# Patient Record
(demographics unavailable — no encounter records)

---

## 2024-10-08 NOTE — PHYSICAL EXAM
[Alert] : alert [Well Nourished] : well nourished [No Acute Distress] : no acute distress [Well Developed] : well developed [Normal Sclera/Conjunctiva] : normal sclera/conjunctiva [EOMI] : extra ocular movement intact [No Proptosis] : no proptosis [Normal Oropharynx] : the oropharynx was normal [Thyroid Not Enlarged] : the thyroid was not enlarged [No Thyroid Nodules] : no palpable thyroid nodules [No Respiratory Distress] : no respiratory distress [No Accessory Muscle Use] : no accessory muscle use [Clear to Auscultation] : lungs were clear to auscultation bilaterally [Normal S1, S2] : normal S1 and S2 [Normal Rate] : heart rate was normal [Regular Rhythm] : with a regular rhythm [No Edema] : no peripheral edema [Pedal Pulses Normal] : the pedal pulses are present [Normal Bowel Sounds] : normal bowel sounds [Not Tender] : non-tender [Not Distended] : not distended [Soft] : abdomen soft [Normal Anterior Cervical Nodes] : no anterior cervical lymphadenopathy [No Spinal Tenderness] : no spinal tenderness [Spine Straight] : spine straight [No Stigmata of Cushings Syndrome] : no stigmata of Cushings Syndrome [Normal Gait] : normal gait [Normal Strength/Tone] : muscle strength and tone were normal [No Rash] : no rash [Normal Reflexes] : deep tendon reflexes were 2+ and symmetric [No Tremors] : no tremors [Oriented x3] : oriented to person, place, and time [Acanthosis Nigricans] : no acanthosis nigricans [de-identified] : Obese male

## 2024-10-08 NOTE — HISTORY OF PRESENT ILLNESS
[FreeTextEntry1] : 63-year-old man with history of type 2 diabetes, hyperlipidemia, hypertension, here to follow-up. Diabetes history: Diagnosed many years ago.   Prior regimen included Farxiga, glipizide, Basaglar.  Patient reports that he has been fasting in the morning. Eats his first meal at 12 PM, usually tunafish sandwich and a piece of bread For dinner, he usually eats grilled chicken on a piece of fish, corn once a while.  He reports adherence to a carb consistent diet. Of note, patient often take very variable dosage of insulin throughout his visits with me or whenever he calls the office for insulin recommendation.  Had poorly controlled type 2 diabetes before on very high doses of insulin.  Also was not taking medication consistently and also not taking the same doses each time therefore it was very difficult to adjust insulin regimen for him.  Patient had made significant changes in his diet.  He also reports a weight loss of 40 pounds intentionally since last visit.  Since then his insulin requirement had decreased significantly.  He reported adherence to his medication.  He checks glucose 3-4 times daily.  He did not bring his meter with him today.  He left in the car.  He may send it up later for us to download.  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  He has been walking a lot at work.  Regarding hypertension, currently taking losartan hydrochlorothiazide 100-25 mg once daily.  Regarding hyperlipidemia, he is currently taking fenofibrate 135 mg once daily and simvastatin 20 mg once daily.  Ophthalmology: Seen in the last few months within normal limits.  No diabetic retinopathy.  Podiatry: Seeing every few months for nail clipping.    DM meds: Ozempic (2 MG/DOSE) 8 MG/3ML Subcutaneous Solution Pen-injector; INJECT  2MG ONCE WEEKLY Metformin ER 1000mg twice daily  Basaglar KwikPen 100 UNIT/ML Subcutaneous Solution Pen-injector; INJECT 60 UNITS NovoLOG FlexPen 100 UNIT/ML Subcutaneous Solution Pen-injector; 5-10 units TID with meals as per sliding scale [Continuous Glucose Monitoring] : Continuous Glucose Monitoring: Yes [Dexcom] : Dexcom [FreeTextEntry2] : 56     [FreeTextEntry3] : 44 [FreeTextEntry4] : 1 [de-identified] : 7.5% [FreeTextEntry5] : 177 [FreeTextEntry7] : 47

## 2024-10-08 NOTE — ASSESSMENT
[FreeTextEntry1] : Mr. IVONE KEITH is 61 year old male here for DM, HTN, and HLD follow up  1. Type 2 DM A1c 7.1% October 2024 A1c 7.6% July 2024 A1c 8.6% April 2023 A1c 6.9% November 2022 A1c 9.3% April 2021 A1c 9.3% December 2020 Diabetes plan: Continue with Basaglar 60 units at bedtime Continue with aspart 5 units 3 times daily with meals with low-dose sliding scale as above. Continue with Ozempic 2.0 mg once weekly Continue with metformin at 1000 mg twice daily  ACP follow-up in 3 months Follow-up with me in 6 months Up-to-date with ophthalmologist Up-to-date with podiatrist every few months.  Discussed risks of the thyroid C-cell tumor, pancreatitis, hypoglycemia, hypersensitivity reactions, acute kidney injury, severe gastrointestinal complications, discussed that the most common adverse infections included nausea, diarrhea, vomiting, and abdominal pain.  We discussed that gastric emptying slow by GLP-1 receptor agonist.   Diabetic retinopathy complications have been reported in a cardiovascular outcomes trial. Should have semi-annual eye exam and close follow up with his/her ophthalmologist.  2. Hypertension.  Blood pressure goal less than 130/80 Recommend to continue with current regimen  Losartan-HCTZ 100-25mg daily Amlodipine 5 mg once daily On ARB for elevated albumin/creatine ratio.  We will be obtaining albumin/renin ratio next visit.  3. Hyperlipidemia.   Continue with fenofibrate 145 mg once daily.  Patient is currently on simvastatin 20 mg once daily Check fasting lipid profile once annually.  4. ART He hasn't been seeing his pulmonary doctor  he reports it improved after weight loss.   He appreciates a referral to see the specialist.

## 2024-10-08 NOTE — THERAPY
[Today's Date] : [unfilled] [TextEntry] : BASAGLAR 60 UNITS  ASPART 5 UNITS WITH MEALS  Take additional  1 Unit(s) if Glucose 151 - 200 2 Unit(s) if Glucose 201 - 250 3 Unit(s) if Glucose 251 - 300 4 Unit(s) if Glucose 301 - 350 5 Unit(s) if Glucose 351 - 400 6 Unit(s) if Glucose GREATER THAN 400 NOTIFY Provider for blood glucose LESS THAN 70 milliGRAM(s)/deciLiter or GREATER THAN 400 milliGRAM(s)/deciliter.  METFORMIN 1000MG twice daily   OZEMPIC 2.0MG ONCE WEEKLY

## 2024-11-20 NOTE — ASSESSMENT
[FreeTextEntry1] : The condition was explained to the patient. Discussed that arthritis is progressive, but has a remitting and relapsing course. Discussed treatment ladder - observation, oral NSAIDs, bracing, steroid injection, and ultimately surgery if necessary. - recommend comfort cool thumb CMCJ splint PRN. - pain guided activity modification, OTC adaptive tools/, moist heat PRN. - recommend OTC pain medications such as Tylenol and NSAIDs as needed. reviewed contra-indicated medical conditions (eg liver disease, kidney disease, or GI ulcer/bleeding) or medications (eg blood thinners). Discussed possible GI and blood pressure side effects.   F/u PRN.

## 2024-11-20 NOTE — IMAGING
[de-identified] : LEFT HAND skin intact. no swelling. TTP to thumb CMCJ. wrist ROM: good extension, flexion. good pronation, supination. good EPL, FPL. fingers good extension, flex to full fist. good finger abduction and adduction. SILT to median, ulnar, radial distribution. palpable radial pulse, brisk cap refill all digits. no triggering.  XRAYS OF LEFT HAND (3 views - PA, OBLIQUE, AND LATERAL VIEWS): no acute displaced fracture or dislocation. djd of thumb CMCJ. djd of thumb IPJ.

## 2024-11-20 NOTE — HISTORY OF PRESENT ILLNESS
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 [employer], ) is here for LEFT thumb pain x 1 year. Denies injury. Reports that he saw an outside provider in May 2024 => CSI. Denies numbness, tingling, radiation, locking, prior injury.   PMH: DM (A1c 7.5) - on insulin, Ozempic, Metformin. HTN. HLD. ART. [FreeTextEntry5] : IVONE muhammad [RHD] 63 year old male is here today c/o LEFT thenar pain for less than one year w/o injury. states he had CSI which resolved symptoms for a few months.

## 2024-12-03 NOTE — PROCEDURE
Samples Given: Merlin (lot [id number], exp 4/2024, two 5g samples) Samples Given: Merlin (lot [id number], exp 4/2024, two 5g samples) [FreeTextEntry3] : The risks, benefits and alternatives to the injection were discussed with the patient. The decision was made to proceed with the injection to reduce inflammation within the area. Verbal consent was obtained for the procedure. The right knee was cleaned with alcohol and ethyl chloride was sprayed topically. 1cc of 40mg Kenalog and 4cc of 1% lidocaine was injected. The patient tolerated the procedure well and was instructed to ice the affected joint as need+-ed later today. Activities can be performed as tolerated

## 2024-12-03 NOTE — ASSESSMENT
[FreeTextEntry1] : 62 year M with right knee OA, prior surgery 50  years ago 1-2 week follow up for CSI since BS is elevated today  05/14/2024: Right knee CSI Follow up PRN  06/04/2024  Left knee CSI today  08/06/2024: R knee CSI Follow up PRN  12/03/2024:  R knee CSI Follow up 3 months

## 2024-12-03 NOTE — IMAGING
[de-identified] : BILATERAL KNEE EXAM Alignment: Neutral, healed anterior surgical incision Effusion: None Atrophy: None                                                  Stable to Varus/valgus stress Posterior Drawer Test: negative Anterior Drawer Test: Equivocal Knee Extension/Flexion: 0 / 120  Medial/lateral compartments Medial joint line: Tenderness Lateral joint line: No Tenderness Saqib test: negative  Patellofemoral joint Medial patellar facet: tenderness Patellar grind: Negative  Tendons: Pes Anserine: No tenderness Gerdys Tubercle/ IT Band: No tenderness Quadriceps Tendon: No Tenderness patellar tendon: no Tenderness Tibial tubercle: not tenderness Calf: no Tenderness  Neurovascular exam Muscle strength: 5-/5 Sensation to light touch: intact Distal pulses: 2+  [id number] Xrays of the Right Knee were taken demonstrating adv medial joint space narrowing left knee with mild OA

## 2025-02-03 NOTE — HISTORY OF PRESENT ILLNESS
[de-identified] : 2/3/25: f/u LEFT thumb CMCJ arthritis. + comfort cool thumb CMC splint. c/o worsening pain. Reports his sciatica has flared up as well. + Advil.  11/20/24: 62yo male (RHD. [employer], ) is here for LEFT thumb pain x 1 year. Denies injury. Reports that he saw an outside provider in May 2024 => CSI. Denies numbness, tingling, radiation, locking, prior injury.   PMH: DM (A1c 7.5) - on insulin, Ozempic, Metformin. HTN. HLD. ART. [FreeTextEntry5] : IVONE is here today to follow up on his LEFT thumb. pt states since last visit, pain increased. wearing splint 75% of the day, some relief.

## 2025-02-03 NOTE — ASSESSMENT
[FreeTextEntry1] : Patient would like to proceed with CSI for thumb CMCJ. Discussed risk of cartilage damage with CSI, particularly with repeated injections. - Discussed risks, benefits, and alternatives as well as contents of injection. Risks include, but are not limited allergic reaction, flare reaction, injection site pain, bruising, numbness, increased blood sugar, elevated blood pressure, facial flushing, skin discoloration, fat atrophy, tendon rupture, and infection. Risk of immune suppression and increased susceptibility to infection with steroid use. Patient expressed understanding and would like to proceed with injection. - The skin over the LEFT thumb CMCJ was cleansed with alcohol/betadine and anesthetized with ethyl chloride and 1cc of 1% lidocaine. The joint was injected with 6mg of celestone. Site was dressed with gauze and an ACE wrap. Patient tolerated the procedure well.  - continue comfort cool thumb CMCJ splint PRN. - prescribed OT for L hand. - pain guided activity modification, OTC adaptive tools/, moist heat PRN. - prescribed Diclofenac gel PRN pain. There is a moderate risk of morbidity with prescription medication due to possible side effects, especially with long term use. These include but are not limited to GI issues (such as stomach pain/upset, heartburn, stomach ulcer), elevated blood pressure, dizziness, bleeding, kidney problems, and cardiac problems. Also discussed risk of skin reactions to topical medications.   F/u PRN.

## 2025-02-03 NOTE — IMAGING
[de-identified] : LEFT HAND skin intact. mild thenar swelling. TTP to thumb CMCJ. wrist ROM: good extension, flexion. good pronation, supination. good EPL, FPL. fingers good extension, flex to full fist. good finger abduction and adduction. SILT to median, ulnar, radial distribution. palpable radial pulse, brisk cap refill all digits. no triggering.  @11/20/25 XRAYS OF LEFT THUMB (3 views - PA, OBLIQUE, AND LATERAL VIEWS): no acute displaced fracture or dislocation. djd of thumb CMCJ. djd of thumb IPJ.

## 2025-02-12 NOTE — ASSESSMENT
[FreeTextEntry1] : PT, meds  Will discuss MRI f/u 6 weeks  Gabapentin- Patient advised of sedating effects, instructed not to drive, operate machinery, or take with other sedating medications. Advised of need to taper on/off medication and risk of abruptly stopping gabapentin.

## 2025-02-12 NOTE — HISTORY OF PRESENT ILLNESS
[de-identified] : Pt seen by non-spine partners in the past  2/12/25- IVONE KEITH is a 63 year old male presenting with lower back pain, no reported injury. Patient states pain started about 6 months ago and worsened over time, notes prior issues with L4-L5. Patient reports pain travels down the right leg; N/T down the right leg. DM last A1C 7.3.

## 2025-02-12 NOTE — IMAGING
[de-identified] : LSPINE Inspection: No rash or ecchymosis Palpation: tenderness to palpation in bilateral lumbar paraspinal musculature, no SI joint tenderness to palpation ROM: Full with no pain Strength: 5/5 bilateral hip flexors, knee extensors, ankle dorsiflexors, EHL, ankle plantarflexors Sensation: Sensation present to light touch bilateral L2-S1 distributions Provocative maneuvers: Positive Right sided straight leg raise  Bilateral hips- Palpation: No tenderness to palpation over greater trochanter or IT band ROM: No pain with flexion and internal rotation [Facet arthropathy] : Facet arthropathy [Disc space narrowing] : Disc space narrowing [AP] : anteroposterior [Moderate arthritis (Tonnis Grade 2)] : Moderate arthritis (Tonnis Grade 2)

## 2025-03-26 NOTE — IMAGING
[Facet arthropathy] : Facet arthropathy [Disc space narrowing] : Disc space narrowing [AP] : anteroposterior [Moderate arthritis (Tonnis Grade 2)] : Moderate arthritis (Tonnis Grade 2) [de-identified] : LSPINE No rash Palpation: tenderness to palpation in bilateral lumbar paraspinal musculature, no SI joint tenderness to palpation ROM: Full with pain Strength: 5/5 bilateral hip flexors, knee extensors, ankle dorsiflexors, EHL, ankle plantarflexors Sensation: Sensation present to light touch bilateral L2-S1 distributions Provocative maneuvers: Positive Right sided straight leg raise  Bilateral hips- Palpation: No tenderness to palpation over greater trochanter or IT band ROM: No pain with flexion and internal rotation

## 2025-03-26 NOTE — ASSESSMENT
[FreeTextEntry1] : Patient has failed 6 weeks of conservative care, including physical therapy/HEP and medications. Will obtain MRI to rule out HNP; will also be used to guide potential future injections/surgical management.   Gabapentin- Patient advised of sedating effects, instructed not to drive, operate machinery, or take with other sedating medications. Advised of need to taper on/off medication and risk of abruptly stopping gabapentin.  Note written for excused absence from work. Patient would like to take a leave of absence. L MRI f/u to discuss L MRI results

## 2025-03-26 NOTE — HISTORY OF PRESENT ILLNESS
[de-identified] : 2/12/25- IVONE KEITH is a 63 year old male presenting with lower back pain, no reported injury. Patient states pain started about 6 months ago and worsened over time, notes prior issues with L4-L5. Patient reports pain travels down the right leg; N/T down the right leg. DM last A1C 7.3. 3/26/25- worsening lower back pain, reports pain going down the right leg. Has been doing HEP.

## 2025-04-11 NOTE — ASSESSMENT
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 Advised of need to taper on/off medication and risk of abruptly stopping gabapentin.

## 2025-04-11 NOTE — HISTORY OF PRESENT ILLNESS
[de-identified] : 4/10/25 Standup Lumbar MRI  - report noted in chart.  Ind. 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 Patient states pain started about 6 months ago and worsened over time, notes prior issues with L4-L5. Patient reports pain travels down the right leg; N/T down the right leg. DM last A1C 7.3. 3/26/25- worsening lower back pain, reports pain going down the right leg. Has been doing HEP.  4/11/25- MRI f/u. A1C is 8.1

## 2025-04-11 NOTE — IMAGING
[Facet arthropathy] : Facet arthropathy [Disc space narrowing] : Disc space narrowing [AP] : anteroposterior [Moderate arthritis (Tonnis Grade 2)] : Moderate arthritis (Tonnis Grade 2) [de-identified] : LSPINE Palpation: tenderness to palpation in bilateral lumbar paraspinal musculature, no SI joint tenderness to palpation ROM: Full with pain Strength: 5/5 bilateral hip flexors, knee extensors, ankle dorsiflexors, EHL, ankle plantarflexors Sensation: Sensation present to light touch bilateral L2-S1 distributions Provocative maneuvers: Positive Right sided straight leg raise  Bilateral hips- Palpation: No tenderness to palpation over greater trochanter or IT band ROM: No pain with flexion and internal rotation

## 2025-04-16 NOTE — IMAGING
[Right] : right knee [All Views] : anteroposterior, lateral, skyline, and anteroposterior standing [advanced tricompartmental OA with medial compartment narrowing and varus alignment] : advanced tricompartmental OA with medial compartment narrowing and varus alignment [Moderate patellofemoral OA] : Moderate patellofemoral OA [de-identified] :   ----------------------------------------------------------------------------   Right knee exam:   Skin: no significant pertinent finding  Inspection:     (min) Effusion     (neg) Malalignment     (neg) Swelling     (neg) Quad atrophy     (neg) J-sign  ROM:     0 - 125 degrees of flexion.  Tenderness:     (+) MJLT     (neg) LJLT     (+) Medial patellar facet tenderness     (+) Lateral patellar facet tenderness     (+) Crepitus     (neg) Patellar grind tenderness     (neg) Patellar tendon     (neg) Quad tendon     Other:  Stability:     (neg) Lachman     (neg) Varus/Valgus instability     (neg) Posterior drawer     (neg) Patellar translation: wnl  Additional tests:     (=) McMurrays test     (neg) Patellar apprehension     Other:  Strength: 5/5 Q/H/TA/GS/EHL  Neuro: In tact to light touch throughout, DTR's wnl  Vascularity: Extremity warm and well perfused  Gait: antalgic

## 2025-04-16 NOTE — HISTORY OF PRESENT ILLNESS
[de-identified] : Pt is here today complaining of RT Knee pain. Pt states can not due injection a1c over 8 and blood sugar 240. PT states constant pain when moving and walking. No trauma or new injury. has done csi in the past with Ciera with improvement.   Works on his feet at [employer] - currently oow

## 2025-04-16 NOTE — DISCUSSION/SUMMARY
[de-identified] : Discussed PT/ nsaids/ CSI / HA inj / TKA  Bracing / compression prn Discussed visco 3, right knee Recommend he continues to manage DM with Endo, improve A1C and glycemic control PT rx fu for visco 3 ----------------------------------------------------------------------------   All relevant imaging studies pertinent to today's visit, including x-rays, MRI's and/or other advanced imaging studies (CT/etc) were independently interpreted and reviewed with the patient as needed. Implications of the studies together with the patient's clinical picture were discussed to formulate a working diagnosis and management options were detailed.   The patient and/or guardian was advised of the diagnosis.  The natural history of the pathology was explained in full. All questions were answered.  The risks and benefits of conservative and interventional treatment alternatives were explained to the patient   The patient and/or guardian was advised if any advanced diagnostic/imaging study (MRI/CT/etc) is ordered to evaluate potential pathology in the affected area(s), they should follow up in the office to review the results of the study and determine further management that may be indicated.

## 2025-04-16 NOTE — REVIEW OF SYSTEMS
[Joint Pain] : joint pain [Joint Stiffness] : joint stiffness [Joint Swelling] : joint swelling [Arthralgia] : no arthralgia

## 2025-04-23 NOTE — DISCUSSION/SUMMARY
[de-identified] : visco3 # 1 R knee fu 1 wk to continue series   ----------------------------------------------------------------------------   All relevant imaging studies pertinent to today's visit, including x-rays, MRI's and/or other advanced imaging studies (CT/etc) were independently interpreted and reviewed with the patient as needed. Implications of the studies together with the patient's clinical picture were discussed to formulate a working diagnosis and management options were detailed.   The patient and/or guardian was advised of the diagnosis.  The natural history of the pathology was explained in full. All questions were answered.  The risks and benefits of conservative and interventional treatment alternatives were explained to the patient   The patient and/or guardian was advised if any advanced diagnostic/imaging study (MRI/CT/etc) is ordered to evaluate potential pathology in the affected area(s), they should follow up in the office to review the results of the study and determine further management that may be indicated.    ----------------------------------------------------------------------------  Large joint injection given: Hyaluronic acid/viscosupplementation to Right knee  Viscosupplementation injection indications at this time include- X-ray evidence of osteoarthritis on this or prior visits, and patient having tried OTC's including aspirin, Ibuprofen, Aleve etc or prescription NSAIDS, and/or exercises at home and/ or physical therapy without satisfactory response.  The risks, benefits, and alternatives to viscosupplementation injection were explained in full to the patient. Risks outlined include but are not limited to infection, sepsis, bleeding, scarring, skin discoloration, temporary increase in pain, syncopal episode, failure to resolve symptoms, allergic reaction, and symptom recurrence.  Patient understood the risks. All questions were answered. After discussion of options, the patient requested viscosupplementation.   An injection of Hyaluronic acid of appropriate formulation was injected into the joint(s) after verbal consent, using sterile technique. The patient tolerated the procedure well and there were no complications.  Instructed patient to apply ice to the injection site. Signs and symptoms of infection reviewed and patient advised to call immediately for redness, fevers, and/or chills.

## 2025-04-23 NOTE — IMAGING
[Right] : right knee [All Views] : anteroposterior, lateral, skyline, and anteroposterior standing [advanced tricompartmental OA with medial compartment narrowing and varus alignment] : advanced tricompartmental OA with medial compartment narrowing and varus alignment [Moderate patellofemoral OA] : Moderate patellofemoral OA [de-identified] :   ----------------------------------------------------------------------------   Right knee exam:   Skin: no significant pertinent finding  Inspection:     (min) Effusion     (neg) Malalignment     (neg) Swelling     (neg) Quad atrophy     (neg) J-sign  ROM:     0 - 125 degrees of flexion.  Tenderness:     (+) MJLT     (neg) LJLT     (+) Medial patellar facet tenderness     (+) Lateral patellar facet tenderness     (+) Crepitus     (neg) Patellar grind tenderness     (neg) Patellar tendon     (neg) Quad tendon     Other:  Stability:     (neg) Lachman     (neg) Varus/Valgus instability     (neg) Posterior drawer     (neg) Patellar translation: wnl  Additional tests:     (=) McMurrays test     (neg) Patellar apprehension     Other:  Strength: 5/5 Q/H/TA/GS/EHL  Neuro: In tact to light touch throughout, DTR's wnl  Vascularity: Extremity warm and well perfused  Gait: antalgic

## 2025-04-23 NOTE — HISTORY OF PRESENT ILLNESS
[de-identified] : Pt is here today complaining of RT Knee pain. Pt states can not due injection a1c over 8 and blood sugar 240. PT states constant pain when moving and walking. No trauma or new injury. has done csi in the past with Ciera with improvement.   Works on his feet at [employer] - currently oow

## 2025-04-30 NOTE — DISCUSSION/SUMMARY
[de-identified] : visco3 # 2 R knee fu 1 wk to continue series   ----------------------------------------------------------------------------   All relevant imaging studies pertinent to today's visit, including x-rays, MRI's and/or other advanced imaging studies (CT/etc) were independently interpreted and reviewed with the patient as needed. Implications of the studies together with the patient's clinical picture were discussed to formulate a working diagnosis and management options were detailed.   The patient and/or guardian was advised of the diagnosis.  The natural history of the pathology was explained in full. All questions were answered.  The risks and benefits of conservative and interventional treatment alternatives were explained to the patient   The patient and/or guardian was advised if any advanced diagnostic/imaging study (MRI/CT/etc) is ordered to evaluate potential pathology in the affected area(s), they should follow up in the office to review the results of the study and determine further management that may be indicated.    ----------------------------------------------------------------------------  Large joint injection given: Hyaluronic acid/viscosupplementation to Right knee  Viscosupplementation injection indications at this time include- X-ray evidence of osteoarthritis on this or prior visits, and patient having tried OTC's including aspirin, Ibuprofen, Aleve etc or prescription NSAIDS, and/or exercises at home and/ or physical therapy without satisfactory response.  The risks, benefits, and alternatives to viscosupplementation injection were explained in full to the patient. Risks outlined include but are not limited to infection, sepsis, bleeding, scarring, skin discoloration, temporary increase in pain, syncopal episode, failure to resolve symptoms, allergic reaction, and symptom recurrence.  Patient understood the risks. All questions were answered. After discussion of options, the patient requested viscosupplementation.   An injection of Hyaluronic acid of appropriate formulation was injected into the joint(s) after verbal consent, using sterile technique. The patient tolerated the procedure well and there were no complications.  Instructed patient to apply ice to the injection site. Signs and symptoms of infection reviewed and patient advised to call immediately for redness, fevers, and/or chills.

## 2025-04-30 NOTE — DISCUSSION/SUMMARY
[de-identified] : visco3 # 2 R knee fu 1 wk to continue series   ----------------------------------------------------------------------------   All relevant imaging studies pertinent to today's visit, including x-rays, MRI's and/or other advanced imaging studies (CT/etc) were independently interpreted and reviewed with the patient as needed. Implications of the studies together with the patient's clinical picture were discussed to formulate a working diagnosis and management options were detailed.   The patient and/or guardian was advised of the diagnosis.  The natural history of the pathology was explained in full. All questions were answered.  The risks and benefits of conservative and interventional treatment alternatives were explained to the patient   The patient and/or guardian was advised if any advanced diagnostic/imaging study (MRI/CT/etc) is ordered to evaluate potential pathology in the affected area(s), they should follow up in the office to review the results of the study and determine further management that may be indicated.    ----------------------------------------------------------------------------  Large joint injection given: Hyaluronic acid/viscosupplementation to Right knee  Viscosupplementation injection indications at this time include- X-ray evidence of osteoarthritis on this or prior visits, and patient having tried OTC's including aspirin, Ibuprofen, Aleve etc or prescription NSAIDS, and/or exercises at home and/ or physical therapy without satisfactory response.  The risks, benefits, and alternatives to viscosupplementation injection were explained in full to the patient. Risks outlined include but are not limited to infection, sepsis, bleeding, scarring, skin discoloration, temporary increase in pain, syncopal episode, failure to resolve symptoms, allergic reaction, and symptom recurrence.  Patient understood the risks. All questions were answered. After discussion of options, the patient requested viscosupplementation.   An injection of Hyaluronic acid of appropriate formulation was injected into the joint(s) after verbal consent, using sterile technique. The patient tolerated the procedure well and there were no complications.  Instructed patient to apply ice to the injection site. Signs and symptoms of infection reviewed and patient advised to call immediately for redness, fevers, and/or chills.

## 2025-04-30 NOTE — IMAGING
[Right] : right knee [All Views] : anteroposterior, lateral, skyline, and anteroposterior standing [advanced tricompartmental OA with medial compartment narrowing and varus alignment] : advanced tricompartmental OA with medial compartment narrowing and varus alignment [Moderate patellofemoral OA] : Moderate patellofemoral OA [de-identified] :   ----------------------------------------------------------------------------   Right knee exam:   Skin: no significant pertinent finding  Inspection:     (min) Effusion     (neg) Malalignment     (neg) Swelling     (neg) Quad atrophy     (neg) J-sign  ROM:     0 - 125 degrees of flexion.  Tenderness:     (+) MJLT     (neg) LJLT     (+) Medial patellar facet tenderness     (+) Lateral patellar facet tenderness     (+) Crepitus     (neg) Patellar grind tenderness     (neg) Patellar tendon     (neg) Quad tendon     Other:  Stability:     (neg) Lachman     (neg) Varus/Valgus instability     (neg) Posterior drawer     (neg) Patellar translation: wnl  Additional tests:     (=) McMurrays test     (neg) Patellar apprehension     Other:  Strength: 5/5 Q/H/TA/GS/EHL  Neuro: In tact to light touch throughout, DTR's wnl  Vascularity: Extremity warm and well perfused  Gait: antalgic

## 2025-04-30 NOTE — IMAGING
[Right] : right knee [All Views] : anteroposterior, lateral, skyline, and anteroposterior standing [advanced tricompartmental OA with medial compartment narrowing and varus alignment] : advanced tricompartmental OA with medial compartment narrowing and varus alignment [Moderate patellofemoral OA] : Moderate patellofemoral OA [de-identified] :   ----------------------------------------------------------------------------   Right knee exam:   Skin: no significant pertinent finding  Inspection:     (min) Effusion     (neg) Malalignment     (neg) Swelling     (neg) Quad atrophy     (neg) J-sign  ROM:     0 - 125 degrees of flexion.  Tenderness:     (+) MJLT     (neg) LJLT     (+) Medial patellar facet tenderness     (+) Lateral patellar facet tenderness     (+) Crepitus     (neg) Patellar grind tenderness     (neg) Patellar tendon     (neg) Quad tendon     Other:  Stability:     (neg) Lachman     (neg) Varus/Valgus instability     (neg) Posterior drawer     (neg) Patellar translation: wnl  Additional tests:     (=) McMurrays test     (neg) Patellar apprehension     Other:  Strength: 5/5 Q/H/TA/GS/EHL  Neuro: In tact to light touch throughout, DTR's wnl  Vascularity: Extremity warm and well perfused  Gait: antalgic

## 2025-04-30 NOTE — HISTORY OF PRESENT ILLNESS
[de-identified] : Pt is here today complaining of RT Knee pain. Pt states can not due injection a1c over 8 and blood sugar 240. PT states constant pain when moving and walking. No trauma or new injury. has done csi in the past with Ciera with improvement.   Works on his feet at [employer] - currently oow

## 2025-04-30 NOTE — HISTORY OF PRESENT ILLNESS
[de-identified] : Pt is here today complaining of RT Knee pain. Pt states can not due injection a1c over 8 and blood sugar 240. PT states constant pain when moving and walking. No trauma or new injury. has done csi in the past with Ciera with improvement.   Works on his feet at [employer] - currently oow

## 2025-05-02 NOTE — HISTORY OF PRESENT ILLNESS
[de-identified] : 4/10/25 Standup Lumbar MRI  - report noted in chart.  At the L5-S1 disc space level, disc herniation is noted deforming the thecal sac abutting the proximal S1 nerve roots bilaterally with bilateral neural foraminal extension abutting the exiting L5 nerve roots contributing to right neural foraminal narrowing in conjunction with facet and ligamentous hypertrophy.  L4-5 and L5-S1 disc degenerative changes are noted with loss of disc space height and signal anterior hypertrophic change and anterior disc extension with Modic type II endplate signal changes.  At L4-5, disc herniation is noted deforming the thecal sac impinging the proximal right L5 nerve root also abutting the proximal left L5 nerve root with bilateral neural foraminal extension left greater than right abutting the exiting left L4 nerve root. Bilateral facet and ligamentous hypertrophy is noted.  At L3-4, disc bulge is noted with bilateral inferior neural foraminal extension. Loss of disc signal is noted with preservation of disc space height.  At L2-3, disc bulge is noted with bilateral inferior neural foraminal extension. Preservation of disc space height and signal is noted.  At L1-2, there is no evidence of herniated disc, spinal canal compromise, neural foraminal stenosis, or loss of disc space height or signal.  At T12-L1, there is no evidence of herniated disc, spinal canal compromise, neural foraminal stenosis, or loss of disc space height or signal. Ind. 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 Patient states pain started about 6 months ago and worsened over time, notes prior issues with L4-L5. Patient reports pain travels down the right leg; N/T down the right leg. DM last A1C 7.3. 3/26/25- worsening lower back pain, reports pain going down the right leg. Has been doing HEP.  4/11/25- MRI f/u. A1C is 8.1 5/2/25- worsening pain down the right leg.

## 2025-05-02 NOTE — RETURN TO WORK/SCHOOL
[FreeTextEntry1] : He will need to remain out of work, pending further evaluation. He will be reevaluated on 7/02/25. If you should have any questions, please call our office at [phone].

## 2025-05-02 NOTE — IMAGING
[Facet arthropathy] : Facet arthropathy [Disc space narrowing] : Disc space narrowing [AP] : anteroposterior [Moderate arthritis (Tonnis Grade 2)] : Moderate arthritis (Tonnis Grade 2) [de-identified] : LSPINE Palpation: tenderness to palpation in bilateral lumbar paraspinal musculature, no SI joint tenderness to palpation ROM: Full with pain Strength: 5/5 bilateral hip flexors, knee extensors, ankle dorsiflexors, EHL, ankle plantarflexors Sensation: Sensation present to light touch bilateral L2-S1 distributions Provocative maneuvers: Positive Right sided straight leg raise  Bilateral hips- Palpation: No tenderness to palpation over greater trochanter or IT band ROM: No pain with flexion and internal rotation

## 2025-05-02 NOTE — ASSESSMENT
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 Phillips, will go for a epidural once gel shot series is done Saw regis, A1C (8.0). Will try to get A1C lower in 3 months OOW until further eval f/u 2 months  Gabapentin- Patient advised of sedating effects, instructed not to drive, operate machinery, or take with other sedating medications. Advised of need to taper on/off medication and risk of abruptly stopping gabapentin.

## 2025-05-07 NOTE — DISCUSSION/SUMMARY
[de-identified] : visco3 # 3 R knee fu 4-6 wks/ prn   ----------------------------------------------------------------------------   All relevant imaging studies pertinent to today's visit, including x-rays, MRI's and/or other advanced imaging studies (CT/etc) were independently interpreted and reviewed with the patient as needed. Implications of the studies together with the patient's clinical picture were discussed to formulate a working diagnosis and management options were detailed.   The patient and/or guardian was advised of the diagnosis.  The natural history of the pathology was explained in full. All questions were answered.  The risks and benefits of conservative and interventional treatment alternatives were explained to the patient   The patient and/or guardian was advised if any advanced diagnostic/imaging study (MRI/CT/etc) is ordered to evaluate potential pathology in the affected area(s), they should follow up in the office to review the results of the study and determine further management that may be indicated.    ----------------------------------------------------------------------------  Large joint injection given: Hyaluronic acid/viscosupplementation to Right knee  Viscosupplementation injection indications at this time include- X-ray evidence of osteoarthritis on this or prior visits, and patient having tried OTC's including aspirin, Ibuprofen, Aleve etc or prescription NSAIDS, and/or exercises at home and/ or physical therapy without satisfactory response.  The risks, benefits, and alternatives to viscosupplementation injection were explained in full to the patient. Risks outlined include but are not limited to infection, sepsis, bleeding, scarring, skin discoloration, temporary increase in pain, syncopal episode, failure to resolve symptoms, allergic reaction, and symptom recurrence.  Patient understood the risks. All questions were answered. After discussion of options, the patient requested viscosupplementation.   An injection of Hyaluronic acid of appropriate formulation was injected into the joint(s) after verbal consent, using sterile technique. The patient tolerated the procedure well and there were no complications.  Instructed patient to apply ice to the injection site. Signs and symptoms of infection reviewed and patient advised to call immediately for redness, fevers, and/or chills.

## 2025-05-07 NOTE — HISTORY OF PRESENT ILLNESS
[3] : 3 [Other:____] : [unfilled] [de-identified] : Pt is here today complaining of RT Knee pain. Pt states can not due injection a1c over 8 and blood sugar 240. PT states constant pain when moving and walking. No trauma or new injury. has done csi in the past with [facility]iera with improvement.   Works on his feet at [employer] - currently oow [] : no [de-identified] : 4/30/25

## 2025-05-07 NOTE — IMAGING
[Right] : right knee [All Views] : anteroposterior, lateral, skyline, and anteroposterior standing [advanced tricompartmental OA with medial compartment narrowing and varus alignment] : advanced tricompartmental OA with medial compartment narrowing and varus alignment [Moderate patellofemoral OA] : Moderate patellofemoral OA [de-identified] :   ----------------------------------------------------------------------------   Right knee exam:   Skin: no significant pertinent finding  Inspection:     (min) Effusion     (neg) Malalignment     (neg) Swelling     (neg) Quad atrophy     (neg) J-sign  ROM:     0 - 125 degrees of flexion.  Tenderness:     (+) MJLT     (neg) LJLT     (+) Medial patellar facet tenderness     (+) Lateral patellar facet tenderness     (+) Crepitus     (neg) Patellar grind tenderness     (neg) Patellar tendon     (neg) Quad tendon     Other:  Stability:     (neg) Lachman     (neg) Varus/Valgus instability     (neg) Posterior drawer     (neg) Patellar translation: wnl  Additional tests:     (=) McMurrays test     (neg) Patellar apprehension     Other:  Strength: 5/5 Q/H/TA/GS/EHL  Neuro: In tact to light touch throughout, DTR's wnl  Vascularity: Extremity warm and well perfused  Gait: antalgic

## 2025-05-07 NOTE — DISCUSSION/SUMMARY
[de-identified] : IVONE KEITH is a 63 year-old man presenting for a NPV for a history of chronic low back pain.      Plan: 1) MRI lumbar spine images reviewed with the patient.

## 2025-05-07 NOTE — DATA REVIEWED
[MRI] : MRI [Lumbar Spine] : lumbar spine [Report was reviewed and noted in the chart] : The report was reviewed and noted in the chart [I independently reviewed and interpreted images and report] : I independently reviewed and interpreted images and report [FreeTextEntry1] : 4/10/2025 MRI Lumbar Spine (STAND UP) INTERPRETATION: Patient was not able to remain still and there is reduction in image clarity  as a result of the patient motion. At the L5-S1 disc space level, disc herniation is noted deforming the thecal sac abutting the  proximal S1 nerve roots bilaterally with bilateral neural foraminal extension abutting the exiting  L5 nerve roots contributing to right neural foraminal narrowing in conjunction with facet and  ligamentous hypertrophy. L4-5 and L5-S1 disc degenerative changes are noted with loss of disc space height and signal  anterior hypertrophic change and anterior disc extension with Modic type II endplate signal  changes. At L4-5, disc herniation is noted deforming the thecal sac impinging the proximal right L5 nerve  root also abutting the proximal left L5 nerve root with bilateral neural foraminal extension left  greater than right abutting the exiting left L4 nerve root. Bilateral facet and ligamentous  hypertrophy is noted. At L3-4, disc bulge is noted with bilateral inferior neural foraminal extension. Loss of disc signal  is noted with preservation of disc space height. At L2-3, disc bulge is noted with bilateral inferior neural foraminal extension. Preservation of  disc space height and signal is noted. At L1-2, there is no evidence of herniated disc, spinal canal compromise, neural foraminal  stenosis, or loss of disc space height or signal. At T12-L1, there is no evidence of herniated disc, spinal canal compromise, neural foraminal  stenosis, or loss of disc space height or signal. There is only limited assessment provided of the T11-12 and T10-11 disc spaces at the peripheral  margin of the included field of view. Schmorl's node formation is noted at L1-2-L4-5 without adjacent bone marrow signal changes to  indicate an acute process. There is no evidence of lumbar vertebral body compression fracture. There is no evidence of  bone marrow infiltrative disorder. There is no evidence of spondylolisthesis. There is no  evidence of signal abnormality within the conus medullaris which is located at the approximate  T12-L1 disc space level. Nonspecific dependent soft tissue edema is noted within the posterior  subcutaneous tissues.  IMPRESSION: - L4-5 and L5-S1 disc herniations deforming the thecal sac with bilateral neural foraminal  extension at both levels abutting the exiting L5 nerve roots bilaterally at L5-S1 and  abutting the exiting left L4 nerve root at L4-5, L5-S1 abutment of the proximal S1 nerve  roots bilaterally, L5-S1 right lateral recess extension impinging the right L5 nerve root  also impressing on the proximal left L5 nerve root with disc degeneration at both levels. - L3-4 and L2-3 disc bulges with bilateral inferior neural foraminal extension at both  levels.

## 2025-05-07 NOTE — HISTORY OF PRESENT ILLNESS
[FreeTextEntry1] : 4/30/2025: IVONE KEITH is a 63 year-old man presenting for a NPV for a history of chronic low back pain.    The patient states that average pain over the past week was /10 in severity. Mood: Sleep:   Prior treatment:

## 2025-06-03 NOTE — HISTORY OF PRESENT ILLNESS
[FreeTextEntry1] : 5/28/2025: IVONE KEITH is a 63 year-old man presenting for a NPV for a history of chronic low back pain.    The patient states that average pain over the past week was /10 in severity. Mood: Sleep:   Prior treatment:

## 2025-06-03 NOTE — DATA REVIEWED
[MRI] : MRI [Lumbar Spine] : lumbar spine [Report was reviewed and noted in the chart] : The report was reviewed and noted in the chart [I independently reviewed and interpreted images and report] : I independently reviewed and interpreted images and report [FreeTextEntry1] : 4/10/2025 MRI Lumbar Spine (Stand Up) At the L5-S1 disc space level, disc herniation is noted deforming the thecal sac abutting the proximal S1 nerve roots bilaterally with bilateral neural foraminal extension abutting the exiting L5 nerve roots contributing to right neural foraminal narrowing in conjunction with facet and ligamentous hypertrophy.  L4-5 and L5-S1 disc degenerative changes are noted with loss of disc space height and signal anterior hypertrophic change and anterior disc extension with Modic type II endplate signal changes.  At L4-5, disc herniation is noted deforming the thecal sac impinging the proximal right L5 nerve root also abutting the proximal left L5 nerve root with bilateral neural foraminal extension left greater than right abutting the exiting left L4 nerve root. Bilateral facet and ligamentous hypertrophy is noted.  At L3-4, disc bulge is noted with bilateral inferior neural foraminal extension. Loss of disc signal is noted with preservation of disc space height.  At L2-3, disc bulge is noted with bilateral inferior neural foraminal extension. Preservation of disc space height and signal is noted.  At L1-2, there is no evidence of herniated disc, spinal canal compromise, neural foraminal stenosis, or loss of disc space height or signal.  At T12-L1, there is no evidence of herniated disc, spinal canal compromise, neural foraminal stenosis, or loss of disc space height or signal.

## 2025-06-03 NOTE — DISCUSSION/SUMMARY
[de-identified] : IVONE KEITH is a 63 year-old man presenting for a NPV for a history of chronic low back pain.     Plan: 1) MRI lumbar spine images reviewed with the patient.

## 2025-07-10 NOTE — ASSESSMENT
[FreeTextEntry1] : Reviewed that arthritis is progressive, but has a remitting and relapsing course. Discussed treatment ladder - activity modification, NSAIDs, bracing, steroid injection, and ultimately surgery if necessary.  - comfort cool thumb CMCJ splint PRN. - again prescribed OT for L hand. - pain guided activity modification, OTC adaptive tools/, moist heat PRN. - Diclofenac gel PRN pain.  F/u PRN.

## 2025-07-10 NOTE — HISTORY OF PRESENT ILLNESS
[de-identified] : 7/10/25: f/u LEFT thumb CMCJ arthritis. s/p CSI on 2/3/25 (no improvement). + comfort cool thumb CMC splint (helps), Diclofenac gel (helps a little). c/o difficulty picking things up and putting on socks. Reports that he recently recovered from a flare in sciatica. Reports prolonged increase in glucose after injection, A1c was up to 8s, now down to 7.3. Did not schedule OT.  2/3/25: f/u LEFT thumb CMCJ arthritis. + comfort cool thumb CMC splint. c/o worsening pain. Reports his sciatica has flared up as well. + Advil.  11/20/24: 64yo male (RHD. [employer], ) is here for LEFT thumb pain x 1 year. Denies injury. Reports that he saw an outside provider in May 2024 => CSI. Denies numbness, tingling, radiation, locking, prior injury.   PMH: DM (A1c 7.3) - on insulin, Ozempic, Metformin. HTN. HLD. ART. [FreeTextEntry5] : IVONE is here today to follow up on his LEFT thumb. pt states since last visit, pain increased. he states CSI did not provide relief. wearing comfort cool brace intermittently. doing HEP.

## 2025-07-10 NOTE — IMAGING
[de-identified] : LEFT HAND skin intact. mild thenar swelling. TTP to thumb CMCJ. wrist ROM: good extension, flexion. good pronation, supination. good EPL, FPL. fingers good extension, flex to full fist. good finger abduction and adduction. SILT to median, ulnar, radial distribution. palpable radial pulse, brisk cap refill all digits. no triggering.  @11/20/24 XRAYS OF LEFT THUMB (3 views - PA, OBLIQUE, AND LATERAL VIEWS): no acute displaced fracture or dislocation. djd of thumb CMCJ. djd of thumb IPJ.

## 2025-07-11 NOTE — HISTORY OF PRESENT ILLNESS
[de-identified] : 4/10/25 Standup Lumbar MRI  - report noted in chart.  At the L5-S1 disc space level, disc herniation is noted deforming the thecal sac abutting the proximal S1 nerve roots bilaterally with bilateral neural foraminal extension abutting the exiting L5 nerve roots contributing to right neural foraminal narrowing in conjunction with facet and ligamentous hypertrophy.  L4-5 and L5-S1 disc degenerative changes are noted with loss of disc space height and signal anterior hypertrophic change and anterior disc extension with Modic type II endplate signal changes.  At L4-5, disc herniation is noted deforming the thecal sac impinging the proximal right L5 nerve root also abutting the proximal left L5 nerve root with bilateral neural foraminal extension left greater than right abutting the exiting left L4 nerve root. Bilateral facet and ligamentous hypertrophy is noted.  At L3-4, disc bulge is noted with bilateral inferior neural foraminal extension. Loss of disc signal is noted with preservation of disc space height.  At L2-3, disc bulge is noted with bilateral inferior neural foraminal extension. Preservation of disc space height and signal is noted.  At L1-2, there is no evidence of herniated disc, spinal canal compromise, neural foraminal stenosis, or loss of disc space height or signal.  At T12-L1, there is no evidence of herniated disc, spinal canal compromise, neural foraminal stenosis, or loss of disc space height or signal. Ind. 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 Patient states pain started about 6 months ago and worsened over time, notes prior issues with L4-L5. Patient reports pain travels down the right leg; N/T down the right leg. DM last A1C 7.3. 3/26/25- worsening lower back pain, reports pain going down the right leg. Has been doing HEP.  4/11/25- MRI f/u. A1C is 8.1 5/2/25- worsening pain down the right leg. 7/11/25-Patient reports his lower back is doing better and reports a lot less pain. Patients reports he is not taking any medications. Patient reports HEP helped. A1C down to 7.3.

## 2025-07-11 NOTE — ASSESSMENT
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 Phillips PRMANE for SYLVIA

## 2025-07-11 NOTE — IMAGING
[Facet arthropathy] : Facet arthropathy [Disc space narrowing] : Disc space narrowing [AP] : anteroposterior [Moderate arthritis (Tonnis Grade 2)] : Moderate arthritis (Tonnis Grade 2) [de-identified] : LSPINE Palpation: tenderness to palpation in bilateral lumbar paraspinal musculature, no SI joint tenderness to palpation ROM: Full with pain Strength: 5/5 bilateral hip flexors, knee extensors, ankle dorsiflexors, EHL, ankle plantarflexors Sensation: Sensation present to light touch bilateral L2-S1 distributions Provocative maneuvers: Positive Right sided straight leg raise  Bilateral hips- Palpation: No tenderness to palpation over greater trochanter or IT band ROM: No pain with flexion and internal rotation